# Patient Record
Sex: FEMALE | Race: WHITE | NOT HISPANIC OR LATINO | Employment: FULL TIME | URBAN - NONMETROPOLITAN AREA
[De-identification: names, ages, dates, MRNs, and addresses within clinical notes are randomized per-mention and may not be internally consistent; named-entity substitution may affect disease eponyms.]

---

## 2021-12-15 ENCOUNTER — OFFICE VISIT (OUTPATIENT)
Dept: NEUROSURGERY | Facility: CLINIC | Age: 36
End: 2021-12-15

## 2021-12-15 VITALS — BODY MASS INDEX: 24.58 KG/M2 | WEIGHT: 156.6 LBS | HEIGHT: 67 IN

## 2021-12-15 DIAGNOSIS — R29.2 HYPERREFLEXIA: ICD-10-CM

## 2021-12-15 DIAGNOSIS — G95.0 SYRINX (HCC): ICD-10-CM

## 2021-12-15 DIAGNOSIS — M79.602 PAIN OF LEFT UPPER EXTREMITY: ICD-10-CM

## 2021-12-15 DIAGNOSIS — M54.2 CERVICALGIA: Primary | ICD-10-CM

## 2021-12-15 DIAGNOSIS — Z72.0 NICOTINE ABUSE: ICD-10-CM

## 2021-12-15 DIAGNOSIS — M54.6 THORACIC BACK PAIN, UNSPECIFIED BACK PAIN LATERALITY, UNSPECIFIED CHRONICITY: ICD-10-CM

## 2021-12-15 PROCEDURE — 99204 OFFICE O/P NEW MOD 45 MIN: CPT | Performed by: NURSE PRACTITIONER

## 2021-12-15 RX ORDER — CELECOXIB 200 MG/1
CAPSULE ORAL
COMMUNITY

## 2021-12-15 NOTE — PROGRESS NOTES
Primary Care Provider: David Carrero MD  Requesting Provider: Daren Hall, *    Chief Complaint:   Chief Complaint   Patient presents with   • Back Pain     Complaints of shoulder pain.         History of Present Illness  Cis being seen for consultation today at the request of Daren Hall MD    HISTORY/ HPI:  Alice Brothers is a 36 y.o.  female who present today with complaints of intermittent neck and upper thoracic back pain.  No previous spine surgeries.  No known injury.    Gradual and progressive onset of neck and upper thoracic back pain over the past 6-7 years.  She currently complains of intermittent neck pain that occasionally radiates in a nondermatomal fashion from the left deltoid to the elbow, occasionally extending to the hand, as well as intermittent numbness and tingling to digits 2-3 of the left hand.  She additionally reports point specific left paraspinal tenderness at the mid left scapula.  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  Her discomfort worsens with physical activities that require repetitive movement and use of her left arm.  Alleviating factors include avoidance and use of Tylenol and Celebrex.  She denies fevers, chills, night sweats, unexplained weight loss, saddle anesthesia, bowel bladder dysfunction.  She currently rates the severity of her symptoms 5/10.  No additional concerns at this time.    Ms. Brothers recently completed 8 sessions of physical therapy with the [facility] with no significant relief in her discomfort.  She does not participate in massage care, chiropractic care, or pain management.    Oswestry Disability Index = 18%   Score   Pain Intensity Very mild pain - 1   Personal Care Look after myself normally but causes extra pain-1   Lifting  Lift heavy weights but gives extra pain-1   Reading Read with slight pain-1   Headaches Slight infrequent headaches-1   Concentration Concentrate fully slight difficulty-1   Work I can do my usual work, but no more-2   Driving Drive with slight pain-1   Sleeping No trouble sleeping-0   Recreation All recreational activities-0     SCORE INTERPRETATION OF THE OSWESTRY NECK DISABILITY QUESTIONNAIRE  10-28: Mild disability   (Jacob et al, 1980)    Modified [place] [employer] (mJOA) score  CATEGORY SCORE   Upper extremity Motor Subscore Mild difficulty buttoning shirt-4   Lower Extremity Subscore Mild imbalance when standing or walking-6   Upper Extremity Sensory Score Normal hand sensation-3   Urinary Function Subscore Normal urination-3   TOTAL = 16/18    The mJOA is an 18 point score of functional disability specific to cervical myelopathy.   15-18: Mild Myelopathy  Uyen et al. (1991). Geraldine et al.     The mJOA is an 18 point score of functional disability specific to cervical myelopathy. Uyen et al. (1991).[2]    ROS:  Review of Systems   Constitutional: Negative.    Eyes: Positive for redness.   Respiratory: Negative.    Cardiovascular: Negative.    Gastrointestinal: Negative.    Endocrine: Negative.    Genitourinary: Negative.    Musculoskeletal: Positive for back pain and neck stiffness.   Skin: Negative.    Allergic/Immunologic: Positive for environmental allergies.   Neurological: Positive for dizziness, light-headedness and headaches.   Hematological: Negative.    Psychiatric/Behavioral: Negative.    All other systems reviewed and are negative.    History reviewed. No pertinent past medical history.    History reviewed. No pertinent surgical history.    Family History: family history is not on file.    Social History:  does not have a smoking history on file. She uses smokeless tobacco. She reports current alcohol use. She reports that she does not use drugs.    Medications:    Current  "Outpatient Medications:   •  celecoxib (CeleBREX) 200 MG capsule, celecoxib 200 mg capsule  TAKE 1 CAPSULE BY MOUTH EVERY DAY, Disp: , Rfl:     Allergies:  Drug [ibuprofen & caffeine-vitamins], Vancomycin, and Penicillins    OBJECTIVE:  Objective   Ht 170.2 cm (67\")   Wt 71 kg (156 lb 9.6 oz)   Breastfeeding No   BMI 24.53 kg/m²   Physical Exam  Vitals and nursing note reviewed.   Constitutional:       General: She is not in acute distress.     Appearance: Normal appearance. She is well-developed, well-groomed and normal weight. She is not ill-appearing, toxic-appearing or diaphoretic.   HENT:      Head: Normocephalic and atraumatic.      Right Ear: Hearing normal.      Left Ear: Hearing normal.   Eyes:      Extraocular Movements: EOM normal.      Conjunctiva/sclera: Conjunctivae normal.      Pupils: Pupils are equal, round, and reactive to light.   Neck:      Trachea: Trachea normal.   Cardiovascular:      Rate and Rhythm: Normal rate and regular rhythm.   Pulmonary:      Effort: Pulmonary effort is normal. No tachypnea, bradypnea, accessory muscle usage or respiratory distress.   Abdominal:      Palpations: Abdomen is soft.   Musculoskeletal:      Cervical back: Full passive range of motion without pain and neck supple.   Skin:     General: Skin is warm and dry.   Neurological:      Mental Status: She is alert and oriented to person, place, and time.      GCS: GCS eye subscore is 4. GCS verbal subscore is 5. GCS motor subscore is 6.      Gait: Gait is intact.      Deep Tendon Reflexes:      Reflex Scores:       Tricep reflexes are 3+ on the right side and 3+ on the left side.       Bicep reflexes are 3+ on the right side and 3+ on the left side.       Brachioradialis reflexes are 3+ on the right side and 3+ on the left side.       Patellar reflexes are 3+ on the right side and 3+ on the left side.       Achilles reflexes are 3+ on the right side and 3+ on the left side.  Psychiatric:         Speech: Speech " normal.         Behavior: Behavior normal. Behavior is cooperative.       Neurologic Exam     Mental Status   Oriented to person, place, and time.   Attention: normal. Concentration: normal.   Speech: speech is normal   Level of consciousness: alert    Cranial Nerves     CN II   Visual fields full to confrontation.     CN III, IV, VI   Pupils are equal, round, and reactive to light.  Extraocular motions are normal.     CN V   Facial sensation intact.     CN VII   Facial expression full, symmetric.     CN VIII   CN VIII normal.     CN IX, X   CN IX normal.     CN XI   CN XI normal.     Motor Exam   Right arm tone: normal  Left arm tone: normal  Right leg tone: normal  Left leg tone: normal    Strength   Right deltoid: 5/5  Left deltoid: 5/5  Right biceps: 5/5  Left biceps: 5/5  Right triceps: 5/5  Left triceps: 5/5  Right wrist extension: 5/5  Left wrist extension: 5/5  Right iliopsoas: 5/5  Left iliopsoas: 5/5  Right quadriceps: 5/5  Left quadriceps: 5/5  Right anterior tibial: 5/5  Left anterior tibial: 5/5  Right gastroc: 5/5  Left gastroc: 5/5  Right EHL 5/5  Left EHL 5/5       Sensory Exam   Right arm light touch: normal  Left arm light touch: normal  Right leg light touch: normal  Left leg light touch: normal    Gait, Coordination, and Reflexes     Gait  Gait: normal    Tremor   Resting tremor: absent  Intention tremor: absent  Action tremor: absent    Reflexes   Right brachioradialis: 3+  Left brachioradialis: 3+  Right biceps: 3+  Left biceps: 3+  Right triceps: 3+  Left triceps: 3+  Right patellar: 3+  Left patellar: 3+  Right achilles: 3+  Left achilles: 3+  Right plantar: normal  Left plantar: normal  Right Bowen: absent  Left Bowen: absent  Right ankle clonus: absent  Left ankle clonus: absent  Right pendular knee jerk: absent  Left pendular knee jerk: absent    Female  strength (pounds)  AGE Right Hand RH Norms Left Hand LH Norms   20-24  70+14.5  61+13.1   25-29  75+13.9  63.5+12   30-34   79+19.2  68+17.7   35-39 *75 74+10.8 67 66+11.7   40-44  70+13.5  62+13.8   45-49  62+15.1  56+12.7   50-54  66+11.6  57+10.7   55-59  57+12.5  47+11.9   60-64  55+10.1  46+10.1   65-69  50+9.7  41+8.2   70-74  50+11.7  42+10.2   75+  43+11.0  38+8.9   (AJ Will et al; Hand Dynometer: Effects of trials and sessions.  Perpetual and Motor Skills 61:195-8, 1985)  * = Dominant hand  > = Intervention    Imaging: (independent review and interpretation)  11/16/2021.  MRI of the chest shows T2 signal change within the central cord from T3-T5 without evidence of central canal or foraminal stenosis.      ASSESSMENT/ PLAN:  Alice Brothers is a 36 y.o. 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  She presents today with a new problem of neck and upper left paraspinal pain with intermittent numbness and tingling to digits 2-3 of the left hand.  OSCAR: 18.  mJOA: 16.  Physical exam findings of bilateral  strengths are within age-matched controls, mild gross hyperreflexia, otherwise neurologically intact.  Her imaging: MRI of the chest shows a syrinx within the central cord from T3-5 without evidence of central canal or foraminal stenosis.    TREATMENT RECOMMENDATIONS ...  Hyperreflexia/myelopathy  Intermittent left arm pain and dysesthesias  DDX:  Congenital: Chiari malformation, tethered cord, syringomyelia, hereditary spastic paraplegia  Acquired: Cervical or thoracic spinal stenosis, traumatic, herniated disc, kyphosis, extra medullary hematopoiesis, epidural lipomatosis, OPLL, Paget's disease  Neoplastic: Spinal cord tumors intra or extra medullary, carcinomatosis, paraneoplastic syndromes  Vascular: Epidural hematoma, spinal cord infarction, vascular malformation such as AVM  Iatrogenic: Radiation myelopathy  Infectious: Post viral or autoimmune.  Often seen in HIV, syphilis, cytomegalovirus, herpes simplex 2 and CJD  Demyelinating:  Acute transverse myelitis, multiple sclerosis, Devic's syndrome  Metabolic: Subacute combined systemic disease due to vitamin B-12 deficiency  Motor neuron disease: Amyotrophic lateral sclerosis, primary lateral sclerosis    Ms. Brothers has already completed a dedicated course of physician directed physical therapy without significant improvement in her discomfort.  For further evaluation we will obtain x-rays of the cervical spine complete with flexion extension, as well has a noncontrasted MRI of the cervical spine to rule out need for surgical intervention.    Thoracic back pain  Thoracic syrinx T3-T5  For continued evaluation of her thoracic back pain and recently noted thoracic syrinx from T3-T5 we will send her for an MRI of the thoracic spine with and without contrast.  We will have her return for reassessment with Dr. Goss at his next available appointment.  I advised the patient to call to return sooner for new or worsening complaints of weakness, paresthesias, gait disturbances, or any additional concerns.  Treatment options discussed in detail with Alice and she voiced understanding.  Ms. Brothers agrees with this plan of care.    Tobacco/nicotine abuse  The patient understands the many dangers of continuing to use tobacco/nicotine.  If quitting becomes an increased priority Alice knows to contact us for help with quitting.       Diagnoses and all orders for this visit:    1. Cervicalgia (Primary)  -     XR Spine Cervical Complete With Flex Ext; Future  -     MRI Cervical Spine Without Contrast; Future    2. Hyperreflexia  -     MRI Cervical Spine Without Contrast; Future    3. Pain of left upper extremity  -     MRI Cervical Spine Without Contrast; Future    4. Thoracic back pain, unspecified back pain laterality, unspecified chronicity  -     MRI Thoracic Spine With & Without Contrast; Future    5. Syrinx (HCC)  Comments:  thoracic T3-T5  Orders:  -     MRI Thoracic Spine With & Without  Contrast; Future    6. Nicotine abuse    Other orders  -     SCANNED - IMAGING  -     SCANNED - IMAGING      Return for FOLLOW UP WITH DR. MEHTA NEXT AVAILABLE WITH MRI CERVICAL, MRI THORACIC.    Thank you for this Consultation and the opportunity to participate in [facility].    Sincerely,  Trace Rogers, RYAN    Level of Risk: Moderate due to: undiagnosed new problem  MDM: Moderate  (Mod = 67437, High = 65196)

## 2021-12-15 NOTE — PATIENT INSTRUCTIONS
Smoking Tobacco Information, Adult  Smoking tobacco can be harmful to your health. Tobacco contains a poisonous (toxic), colorless chemical called nicotine. Nicotine is addictive. It changes the brain and can make it hard to stop smoking. Tobacco also has other toxic chemicals that can hurt your body and raise your risk of many cancers.  How can smoking tobacco affect me?  Smoking tobacco puts you at risk for:  · Cancer. Smoking is most commonly associated with lung cancer, but can also lead to cancer in other parts of the body.  · Chronic obstructive pulmonary disease (COPD). This is a long-term lung condition that makes it hard to breathe. It also gets worse over time.  · High blood pressure (hypertension), heart disease, stroke, or heart attack.  · Lung infections, such as pneumonia.  · Cataracts. This is when the lenses in the eyes become clouded.  · Digestive problems. This may include peptic ulcers, heartburn, and gastroesophageal reflux disease (GERD).  · Oral health problems, such as gum disease and tooth loss.  · Loss of taste and smell.  Smoking can affect your appearance by causing:  · Wrinkles.  · Yellow or stained teeth, fingers, and fingernails.  Smoking tobacco can also affect your social life, because:  · It may be challenging to find places to smoke when away from home. Many workplaces, restaurants, hotels, and public places are tobacco-free.  · Smoking is expensive. This is due to the cost of tobacco and the long-term costs of treating health problems from smoking.  · Secondhand smoke may affect those around you. Secondhand smoke can cause lung cancer, breathing problems, and heart disease. Children of smokers have a higher risk for:  ? Sudden infant death syndrome (SIDS).  ? Ear infections.  ? Lung infections.  If you currently smoke tobacco, quitting now can help you:  · Lead a longer and healthier life.  · Look, smell, breathe, and feel better over time.  · Save money.  · Protect others from the  harms of secondhand smoke.  What actions can I take to prevent health problems?  Quit smoking    · Do not start smoking. Quit if you already do.  · Make a plan to quit smoking and commit to it. Look for programs to help you and ask your health care provider for recommendations and ideas.  · Set a date and write down all the reasons you want to quit.  · Let your friends and family know you are quitting so they can help and support you. Consider finding friends who also want to quit. It can be easier to quit with someone else, so that you can support each other.  · Talk with your health care provider about using nicotine replacement medicines to help you quit, such as gum, lozenges, patches, sprays, or pills.  · Do not replace cigarette smoking with electronic cigarettes, which are commonly called e-cigarettes. The safety of e-cigarettes is not known, and some may contain harmful chemicals.  · If you try to quit but return to smoking, stay positive. It is common to slip up when you first quit, so take it one day at a time.  · Be prepared for cravings. When you feel the urge to smoke, chew gum or suck on hard candy.    Lifestyle  · Stay busy and take care of your body.  · Drink enough fluid to keep your urine pale yellow.  · Get plenty of exercise and eat a healthy diet. This can help prevent weight gain after quitting.  · Monitor your eating habits. Quitting smoking can cause you to have a larger appetite than when you smoke.  · Find ways to relax. Go out with friends or family to a movie or a restaurant where people do not smoke.  · Ask your health care provider about having regular tests (screenings) to check for cancer. This may include blood tests, imaging tests, and other tests.  · Find ways to manage your stress, such as meditation, yoga, or exercise.  Where to find support  To get support to quit smoking, consider:  · Asking your health care provider for more information and resources.  · Taking classes to  learn more about quitting smoking.  · Looking for local organizations that offer resources about quitting smoking.  · Joining a support group for people who want to quit smoking in your local community.  · Calling the smokefree.gov counselor helpline: 1-800-Quit-Now (1-467.390.1524)  Where to find more information  You may find more information about quitting smoking from:  · HelpGuide.org: www.helpguide.org  · Smokefree.gov: smokefree.gov  · American Lung Association: www.lung.org  Contact a health care provider if you:  · Have problems breathing.  · Notice that your lips, nose, or fingers turn blue.  · Have chest pain.  · Are coughing up blood.  · Feel faint or you pass out.  · Have other health changes that cause you to worry.  Summary  · Smoking tobacco can negatively affect your health, the health of those around you, your finances, and your social life.  · Do not start smoking. Quit if you already do. If you need help quitting, ask your health care provider.  · Think about joining a support group for people who want to quit smoking in your local community. There are many effective programs that will help you to quit this behavior.  This information is not intended to replace advice given to you by your health care provider. Make sure you discuss any questions you have with your health care provider.  Document Revised: 09/11/2020 Document Reviewed: 01/02/2018  Elsevier Patient Education © 2021 [employer].

## 2022-01-04 ENCOUNTER — HOSPITAL ENCOUNTER (OUTPATIENT)
Dept: MRI IMAGING | Facility: HOSPITAL | Age: 37
Discharge: HOME | End: 2022-01-04

## 2022-01-04 ENCOUNTER — HOSPITAL ENCOUNTER (OUTPATIENT)
Dept: GENERAL RADIOLOGY | Facility: HOSPITAL | Age: 37
Discharge: HOME | End: 2022-01-04

## 2022-01-04 DIAGNOSIS — M54.2 CERVICALGIA: ICD-10-CM

## 2022-01-04 DIAGNOSIS — M79.602 PAIN OF LEFT UPPER EXTREMITY: ICD-10-CM

## 2022-01-04 DIAGNOSIS — G95.0 SYRINX: ICD-10-CM

## 2022-01-04 DIAGNOSIS — R29.2 HYPERREFLEXIA: ICD-10-CM

## 2022-01-04 DIAGNOSIS — M54.6 THORACIC BACK PAIN, UNSPECIFIED BACK PAIN LATERALITY, UNSPECIFIED CHRONICITY: ICD-10-CM

## 2022-01-04 PROCEDURE — 72157 MRI CHEST SPINE W/O & W/DYE: CPT

## 2022-01-04 PROCEDURE — A9577 INJ MULTIHANCE: HCPCS | Performed by: NURSE PRACTITIONER

## 2022-01-04 PROCEDURE — 82565 ASSAY OF CREATININE: CPT

## 2022-01-04 PROCEDURE — 0 GADOBENATE DIMEGLUMINE 529 MG/ML SOLUTION: Performed by: NURSE PRACTITIONER

## 2022-01-04 PROCEDURE — 72052 X-RAY EXAM NECK SPINE 6/>VWS: CPT

## 2022-01-04 PROCEDURE — 72141 MRI NECK SPINE W/O DYE: CPT

## 2022-01-04 RX ADMIN — GADOBENATE DIMEGLUMINE 15 ML: 529 INJECTION, SOLUTION INTRAVENOUS at 16:07

## 2022-01-05 LAB — CREAT BLDA-MCNC: 0.8 MG/DL (ref 0.6–1.3)

## 2022-01-26 ENCOUNTER — TELEPHONE (OUTPATIENT)
Dept: NEUROSURGERY | Facility: CLINIC | Age: 37
End: 2022-01-26

## 2022-01-27 DIAGNOSIS — G95.0 SYRINX: Primary | ICD-10-CM

## 2022-02-08 ENCOUNTER — TELEPHONE (OUTPATIENT)
Dept: NEUROSURGERY | Facility: CLINIC | Age: 37
End: 2022-02-08

## 2022-02-08 NOTE — TELEPHONE ENCOUNTER
LVM - NEED TO KNOW IF SHE WANTS HER CD BACK FROM [facility] - 2ND MESS

## 2022-02-08 NOTE — TELEPHONE ENCOUNTER
PLEASE MAIL CD TO PT HOME ADDRESS.    PLEASE CALL PT REGARDING APT CHANGE.  SHE WOULD LIKE TO BE SEEN SOONER TO GET RESULTS OF MRI.  MRI IS COMPLETE. [facility] PAD    THANK YOU

## 2022-02-09 ENCOUNTER — TELEPHONE (OUTPATIENT)
Dept: NEUROSURGERY | Facility: CLINIC | Age: 37
End: 2022-02-09

## 2022-07-21 ENCOUNTER — HOSPITAL ENCOUNTER (OUTPATIENT)
Dept: MRI IMAGING | Facility: HOSPITAL | Age: 37
Discharge: HOME | End: 2022-07-21
Admitting: NEUROLOGICAL SURGERY

## 2022-07-21 DIAGNOSIS — G95.0 SYRINX: ICD-10-CM

## 2022-07-21 PROCEDURE — 72157 MRI CHEST SPINE W/O & W/DYE: CPT

## 2022-07-21 PROCEDURE — 0 GADOBENATE DIMEGLUMINE 529 MG/ML SOLUTION: Performed by: NEUROLOGICAL SURGERY

## 2022-07-21 PROCEDURE — A9577 INJ MULTIHANCE: HCPCS | Performed by: NEUROLOGICAL SURGERY

## 2022-07-21 RX ADMIN — GADOBENATE DIMEGLUMINE 14 ML: 529 INJECTION, SOLUTION INTRAVENOUS at 18:06

## 2022-07-26 NOTE — PROGRESS NOTES
Syrinx appears stable.  Subtle 2 level stenosis with spinal syrinx.  Could consider decompression.  Keep FU appt.

## 2022-09-14 ENCOUNTER — OFFICE VISIT (OUTPATIENT)
Dept: NEUROSURGERY | Facility: CLINIC | Age: 37
End: 2022-09-14

## 2022-09-14 VITALS — HEIGHT: 67 IN | BODY MASS INDEX: 24.92 KG/M2 | WEIGHT: 158.8 LBS

## 2022-09-14 DIAGNOSIS — R20.0 NUMBNESS AND TINGLING OF BOTH UPPER EXTREMITIES: ICD-10-CM

## 2022-09-14 DIAGNOSIS — G56.20 CUBITAL TUNNEL SYNDROME, UNSPECIFIED LATERALITY: ICD-10-CM

## 2022-09-14 DIAGNOSIS — M54.12 CERVICAL RADICULOPATHY: ICD-10-CM

## 2022-09-14 DIAGNOSIS — R20.2 NUMBNESS AND TINGLING OF BOTH UPPER EXTREMITIES: ICD-10-CM

## 2022-09-14 DIAGNOSIS — G95.0 SYRINX: Primary | ICD-10-CM

## 2022-09-14 DIAGNOSIS — M50.20 CERVICAL HERNIATED DISC: ICD-10-CM

## 2022-09-14 PROCEDURE — 99214 OFFICE O/P EST MOD 30 MIN: CPT | Performed by: NEUROLOGICAL SURGERY

## 2022-09-14 NOTE — PROGRESS NOTES
Chief complaint:   Chief Complaint   Patient presents with   • Follow-up     Pt is here for a follow up after MRI. She had the MRI done in June. She is still having neck pain. Her pain level today is 4/10. This gets worse when she works all day.        Subjective     HPI:   Interval History: Alice returns today for follow-up regarding cervical syrinx and small amount of cervical stenosis.    She has neck pain has been present for several years.  Approximately 8 to 9 years ago when she was pregnant with her son she began to have some left leg pain and left leg numbness.  She also states that she will have intermittent swelling of her left hand and left leg.  Most everything seems to be on the left side of her body she states.    Currently she complains of neck pain that is a 4-10 in severity.  She has radiation into her left arm but has difficulty localizing this to a single dermatome.  She has numbness in her left arm that comes and goes and it feels like she hit her left funny bone.  She does have a prior history of an EMG and nerve conduction study performed at [facility] years ago that showed ulnar neuropathy.  She also has some intermittent weakness in her left leg and states that it feels heavy or funny.  Currently no loss of fine motor function.  She has a normal gait with no bowel or bladder dysfunction.  She has previously undergone a physician directed course of physical therapy and Occupational Therapy and had a repeat MRI space 6 months after initial MRI that showed no dilation of her syrinx.    She also states that she feels like she has a bump in her neck that will occasionally come up with neck positioning.  And she has point tenderness in the middle of her thoracic back.    Review of Systems   HENT: Negative.    Eyes: Negative.    Respiratory: Negative.    Cardiovascular: Negative.    Gastrointestinal: Negative.    Endocrine: Negative.    Genitourinary: Negative.    Musculoskeletal: Positive  "for back pain, neck pain and neck stiffness.   Skin: Negative.    Allergic/Immunologic: Negative.    Neurological: Positive for weakness and numbness.   Hematological: Negative.    Psychiatric/Behavioral: Negative.        PFSH:  No past medical history on file.    No past surgical history on file.    Objective      Current Outpatient Medications   Medication Sig Dispense Refill   • celecoxib (CeleBREX) 200 MG capsule celecoxib 200 mg capsule   TAKE 1 CAPSULE BY MOUTH EVERY DAY       No current facility-administered medications for this visit.       Vital Signs  Ht 170 cm (66.93\")   Wt 72 kg (158 lb 12.8 oz)   BMI 24.92 kg/m²   Physical Exam  Eyes:      Extraocular Movements: EOM normal.      Pupils: Pupils are equal, round, and reactive to light.   Neurological:      Mental Status: She is oriented to person, place, and time.      Gait: Gait is intact.      Deep Tendon Reflexes:      Reflex Scores:       Tricep reflexes are 2+ on the right side and 1+ on the left side.       Bicep reflexes are 2+ on the right side and 1+ on the left side.       Brachioradialis reflexes are 2+ on the right side and 1+ on the left side.       Patellar reflexes are 2+ on the right side and 2+ on the left side.       Achilles reflexes are 2+ on the right side and 2+ on the left side.  Psychiatric:         Speech: Speech normal.       Neurologic Exam     Mental Status   Oriented to person, place, and time.   Attention: normal. Concentration: normal.   Speech: speech is normal   Level of consciousness: alert    Cranial Nerves     CN II   Visual fields full to confrontation.     CN III, IV, VI   Pupils are equal, round, and reactive to light.  Extraocular motions are normal.     CN V   Facial sensation intact.     CN VII   Facial expression full, symmetric.     CN VIII   CN VIII normal.     CN IX, X   CN IX normal.     CN XI   CN XI normal.     Motor Exam   Right arm tone: normal  Left arm tone: normal  Right leg tone: normal  Left leg " tone: normal    Strength   Right deltoid: 5/5  Left deltoid: 5/5  Right biceps: 5/5  Left biceps: 5/5  Right triceps: 5/5  Left triceps: 5/5  Right wrist extension: 5/5  Left wrist extension: 5/5  Right iliopsoas: 5/5  Left iliopsoas: 5/5  Right quadriceps: 5/5  Left quadriceps: 5/5  Right anterior tibial: 5/5  Left anterior tibial: 5/5  Right gastroc: 5/5  Left gastroc: 5/5  Right EHL 5/5  Left EHL 5/5       Sensory Exam   Right arm light touch: normal  Left arm light touch: normal  Right leg light touch: normal  Left leg light touch: normal  Intermittent numbness and pain in her left upper extremity.      Gait, Coordination, and Reflexes     Gait  Gait: normal    Tremor   Resting tremor: absent  Intention tremor: absent  Action tremor: absent    Reflexes   Right brachioradialis: 2+  Left brachioradialis: 1+  Right biceps: 2+  Left biceps: 1+  Right triceps: 2+  Left triceps: 1+  Right patellar: 2+  Left patellar: 2+  Right achilles: 2+  Left achilles: 2+  Right plantar: normal  Left plantar: normal  Right Bowen: absent  Left Bowen: absent  Right ankle clonus: absent  Left ankle clonus: absent  Right pendular knee jerk: absent  Left pendular knee jerk: absent    (12 bullet pts)    Results Review:   No radiology results for the last 30 days.    Female  strength (pounds)  AGE Right Hand RH Norms Left Hand LH Norms   20-24   70+14.5   61+13.1   25-29   75+13.9   63.5+12   30-34   79+19.2   68+17.7   35-39 *75,80 74+10.8 67,72 66+11.7   40-44   70+13.5   62+13.8   45-49   62+15.1   56+12.7   50-54   66+11.6   57+10.7   55-59   57+12.5   47+11.9   60-64   55+10.1   46+10.1   65-69   50+9.7   41+8.2   70-74   50+11.7   42+10.2   75+   43+11.0   38+8.9   (AJ Will et al; Hand Dynometer: Effects of trials and sessions.  Perpetual and Motor Skills 61:195-8, 1985)  * = Dominant hand  > = Intervention     Imaging: (independent review and interpretation)    AP lateral flexion-extension x-rays are normal      1/2022  -two-level mild degenerative disc disease causing very mild stenosis.  C5/6 and C6/7.  C6/7 disc is asymmetric to the left small dilated central cord versus cervical syrinx               11/16/2021.  MRI of the chest shows T2 signal change within the central cord from T3-T5 without evidence of central canal or foraminal stenosis.      7/2022 -MRI of the thoracic spine with small dilated central canal versus cervical syrinx       ASSESSMENT/ PLAN:  Alice Brothers is a 36 y.o. 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  She presents today with a new problem of neck and upper left paraspinal pain with intermittent numbness and tingling to digits 2-3 of the left hand.  OSCAR: 18.  mJOA: 16.  Physical exam findings of bilateral  strengths are within age-matched controls, mild gross hyperreflexia, otherwise neurologically intact.  Her imaging: MRI of the chest shows a syrinx within the central cord from T3-5 without evidence of central canal or foraminal stenosis.     TREATMENT RECOMMENDATIONS ...  Intermittent left arm pain and dysesthesias  2 level cervical disc degeneration  Possible left C6 or C7 radiculopathy  H/O ulnar neuropathy  Alice and I discussed her history of presenting illness, physical exam findings and imaging.  Fortunately despite her left arm pain which is intermittent in nature she remains neurologically intact without evidence of biceps or triceps weakness and no permanent neurological deficit.  She has a small disc herniation asymmetric to the left at C6/7.  This could potentially be a radiculopathy.  She also has a history of ulnar neuropathy which is not been treated.  Based on these findings I would like to obtain an EMG nerve conduction study of the bilateral upper extremities to rule in or rule out radiculopathy.     Thoracic back pain  Thoracic syrinx T3-T5 vs dilated cerntral cannal  DDX: Spinal cord  syrinx, hydromyelia (dilated or residual central spinal canal), much less likely tumor cyst, postinfectious    Key points  -Syrinx is a cystic cavitation of the spinal cord  -70% are associated with Chiari I malformation, 10% with basilar invagination.  May also be posttraumatic or associated with tumor or infection  -Greater than 5 mm plus associated edema predicts more rapid deterioration.  YOURS IS LESS THAN 2mm.   -Preferred treatment is correction of the underlying pathology (if present) rather than direct cyst drainage    Epidemiology: 8.4 cases per 100,000 population.    Etiologies: Primary idiopathic, or secondary to the following pathologies ... Chiari I malformation, postinflammatory, postinfectious, rarely secondary to subarachnoid hemorrhage, posttraumatic, associated with neoplasm, cerebellar ectopia, or Dandy-Walker syndrome.  YOU HAVE NONE OF THESE CONDITIONS    In this setting I would favor this to be an idiopathic syrinx.  Three study have shown that Idiopathic syrinx is a benign pathology, which can be managed expectantly. Most cases remain stable or improve over time.    Festus RN, Pat R, Rossy A, Marlen S, Reshma PD, Ame DJ. Management of isolated syringomyelia in the paediatric population--a review of imaging and follow-up in a single centre. Br J Neurosurg. 2013 Oct;27(5):683-6.    Terrell A, Thelma EN, Somasurosita A, Veronica DE. Management of idiopathic pediatric syringohydromyelia. J Neurosurg Pediatr. 2015 Oct;16(4):452-7.    Dequan AK, Penelope NP, Samantha A. Idiopathic syringomyelia: retrospective case series, comprehensive review, and update on management. Neurosurg Focus. 2011 Dec;31(6):E15.         Tobacco/nicotine abuse  The patient understands the many dangers of continuing to use tobacco/nicotine.  If quitting becomes an increased priority Alice knows to contact us for help with quitting.           1. Syrinx (HCC)    2. Cervical herniated disc    3. Cervical radiculopathy     4. Cubital tunnel syndrome, unspecified laterality    5. Numbness and tingling of both upper extremities        Recommendations:  Diagnoses and all orders for this visit:    1. Syrinx (HCC) (Primary)    2. Cervical herniated disc    3. Cervical radiculopathy    4. Cubital tunnel syndrome, unspecified laterality    5. Numbness and tingling of both upper extremities  -     EMG & Nerve Conduction Test; Future        Return if symptoms worsen or fail to improve.    I spent 47 minutes caring for Alice on this date of service. This time includes time spent by me in the following activities: preparing for the visit, reviewing tests, obtaining and/or reviewing a separately obtained history, performing a medically appropriate examination and/or evaluation, counseling and educating the patient/family/caregiver, ordering medications, tests, or procedures, referring and communicating with other health care professionals, documenting information in the medical record, independently interpreting results and communicating that information with the patient/family/caregiver, and/or care coordination.     Medical Decision Making (2/3)  Problem Points (2,3,4 or more)  Established Problem, stable = 1x2  Data Points (2,3,4 or more)  Reviewed/ordered other tests (EMG, NCS, MARCIO, echo, ekg, PFT) = 1x1.  Independent review of imaging or specimen = 2x3  Risk (Low, Mod, High)  2 or more Chronic illnesses (Moderate)    E/M = MDM 2 out of 3   or  Time  Est Level 4 - 03736 = Mod (3, 3, Mod)   or   30-39 minutes    Thank you, for allowing me to continue to participate in the care of this patient.    Sincerely,  Fernando Goss MD

## 2022-09-14 NOTE — PATIENT INSTRUCTIONS
Cervical Traction for Neck Pain            What is cervical traction?  Traction of the spine, known as cervical traction, is a popular treatment for neck pain and related injuries. Essentially, cervical traction pulls your head away from your neck to create expansion and eliminate compression. It’s considered to be an alternative treatment for neck pain, helping people avoid the need for medication or surgeries. It can be used as part of a physical therapy treatment or on your own at home.  Cervical traction devices lightly stretch the neck to reduce pressure on the spine by pulling or  the vertebrae. It’s said to be both highly effective and fast-acting. Read on to learn more about this technique and how it can be of benefit to you.    Benefits of cervical traction  Cervical traction devices treat different types and causes of neck pain, tension, and tightness. Cervical traction helps to relax the muscles, which can significantly relieve pain and stiffness while increasing flexibility. It’s also used to treat and flatten bulging or herniated disks. It can alleviate pain from joints, sprains, and spasms. It’s also used to treat neck injuries, pinched nerves, and cervical spondylosis.    Cervical traction devices work by stretching the spinal vertebrae and muscles to relieve pressure and pain. Force or tension is used to stretch or pull the head away from the neck. Creating space between the vertebrae relieves compression and allows the muscles to relax. This lengthens or stretches the muscles and joints around the neck.  These improvements may lead to improved mobility, range of motion, and alignment. This will allow you to go about your daily activities with greater ease.    A 2017 meta-analysis of studies analyzed the effectiveness of cervical traction in relieving neck pain. This report found that the treatment significantly reduced neck pain immediately following treatment. Pain scores were also  reduced in the follow-up period. More in-depth, high-quality studies are needed to learn more about the long-term effects of this treatment.     A 2014 study found that mechanical traction was effective in treating people with pinched nerves and neck pain. Mechanical traction was more effective than exercising alone or exercising in addition to using over-door traction.     How it’s done  There are several ways to do cervical traction, either with a physical therapist or on your own at home. Your physical therapist can help you to decide upon the best method to suit your needs.  Your physical therapist may recommend that you buy cervical traction equipment to use at home. Certain devices may require you to have a prescription. Cervical traction devices are available online and in medical supply stores. Your physical therapist should show you how to use the device properly before you use it on your own.  It’s important that you check in with your physical therapist even if you’re doing a home treatment. They’ll make sure you’re doing the best treatment, measure your progress, and adjust your therapy as necessary.    Manual cervical traction  Manual cervical traction is done by a physical therapist. While you’re lying down, they’ll gently pull your head away from your neck. They’ll hold this position for a period of time before releasing and repeating. Your physical therapist will make adjustments to your exact positioning in order to get the best results.    Mechanical cervical traction  Mechanical cervical traction is done by a physical therapist. A harness is attached to your head and neck as you’re lying flat on your back. The harness hooks up to a machine or system of weights that apply traction force to pull your head away from your neck and spine.    Over-the-door cervical traction  An over-the-door traction device is for home use. You attach your head and neck to a harness. This is connected to a rope that’s  part of a weighted pulley system that goes over a door. This can be done while sitting, leaning back, or lying down.     Side effects and warnings  Generally, it’s safe to perform cervical traction, but remember that results are different for everyone. The treatment should be totally pain-free.   It’s possible that you can experience side effects such as headache, dizziness, and nausea upon adjusting your body in this manner. This may even lead to fainting. Stop if you experience any of these side effects, and discuss them with your doctor or physical therapist.   It’s possible for you to injure your tissue, neck, or spine. You should avoid cervical traction if you have:  rheumatoid arthritis   postsurgery hardware such as screws in your neck   a recent fracture or injury in the neck area   a known tumor in the neck area   a bone infection   issues or blockages with vertebral or carotid arteries   osteoporosis   cervical instability   spinal hypermobility  It’s important that you follow any safety instructions and recommendations provided by your doctor or by the . Make sure you’re performing the movements correctly and using the appropriate amount of weight. Don’t overexert yourself by doing cervical traction for too long. Discontinue use if you experience any pain or irritation or if your symptoms get worse.    Cervical traction exercises  There are several exercises that can be done using cervical traction devices. Make sure to listen to your body and go to your own edge or threshold in terms of stretching and the duration of your exercises.     To use an air neck traction device, place it around your neck and adjust the straps as necessary. Then, pump it up and wear it for about 20-30 minutes. Do this a few times throughout the day. You can wear the device while doing activities where you tend to slouch.  To use an over-the-door neck traction device, you’ll usually you’ll start with about 10-20  pounds of pulling force, which can be increased as you gain strength. Your physical therapist can recommend the right amount of weight for you to use. Pull and hold the weight for 10-20 seconds and then slowly release. Continue this for 15-30 minutes at a time. You can do this a few times throughout the day.    A Posture Pump is used while you’re lying down. Do a warm-up before using this device. Slowly turn the head side-to-side, then forward and backward, and then lean the neck from side-to-side. Do each exercise 10 times. Then, attach the portable device to your head and increase the pressure so it tightens around your forehead. Once it’s pumped, wait 10 seconds before releasing the air. Do this 15 times. Then inflate the unit and relax in a comfortable position for up to 15 minutes. Make sure you’re not pumping it too much, especially in the beginning. Once you release yourself from the pump, keep your head in line with your spine as you come into a standing position. Repeat the warm-up routine.   You may also wish to incorporate stretching into your daily routine. You can use accessories such as exercise balls or resistance bands. Yoga is another great tool to relieve neck pain, and there are plenty of cervical traction exercises your physical therapist may be able to recommend that don’t require any equipment aside from a bed or table.     The takeaway  Cervical traction may be a safe, wonderfully effective way for you to resolve neck pain. It may provide you with numerous improvements to your body, inspiring you to do it often. Ideally it will be effective in relieving neck pain and enhancing your overall function.  Always talk to your doctor or physical therapist before beginning any treatment. Touch base with them throughout your therapy to discuss your improvements as well as any side effects. They can also help you to set up a treatment plan that addresses exactly what you need to correct.        Cervical  Neck Pillows